# Patient Record
Sex: MALE | Race: WHITE | HISPANIC OR LATINO | Employment: OTHER | URBAN - METROPOLITAN AREA
[De-identification: names, ages, dates, MRNs, and addresses within clinical notes are randomized per-mention and may not be internally consistent; named-entity substitution may affect disease eponyms.]

---

## 2020-08-09 ENCOUNTER — HOSPITAL ENCOUNTER (EMERGENCY)
Facility: HOSPITAL | Age: 40
Discharge: HOME | End: 2020-08-09
Attending: EMERGENCY MEDICINE | Admitting: EMERGENCY MEDICINE

## 2020-08-09 ENCOUNTER — APPOINTMENT (OUTPATIENT)
Dept: CT IMAGING | Facility: HOSPITAL | Age: 40
End: 2020-08-09

## 2020-08-09 VITALS
RESPIRATION RATE: 16 BRPM | WEIGHT: 148 LBS | TEMPERATURE: 96.9 F | DIASTOLIC BLOOD PRESSURE: 99 MMHG | OXYGEN SATURATION: 100 % | BODY MASS INDEX: 23.78 KG/M2 | SYSTOLIC BLOOD PRESSURE: 132 MMHG | HEIGHT: 66 IN | HEART RATE: 69 BPM

## 2020-08-09 DIAGNOSIS — M25.551 PELVIC JOINT PAIN, RIGHT: Primary | ICD-10-CM

## 2020-08-09 LAB
ANION GAP SERPL CALCULATED.3IONS-SCNC: 8.1 MMOL/L (ref 5–15)
BASOPHILS # BLD AUTO: 0.02 10*3/MM3 (ref 0–0.2)
BASOPHILS NFR BLD AUTO: 0.4 % (ref 0–1.5)
BUN SERPL-MCNC: 11 MG/DL (ref 6–20)
BUN/CREAT SERPL: 11.7 (ref 7–25)
CALCIUM SPEC-SCNC: 9.5 MG/DL (ref 8.6–10.5)
CHLORIDE SERPL-SCNC: 106 MMOL/L (ref 98–107)
CO2 SERPL-SCNC: 26.9 MMOL/L (ref 22–29)
CREAT SERPL-MCNC: 0.94 MG/DL (ref 0.76–1.27)
DEPRECATED RDW RBC AUTO: 42 FL (ref 37–54)
EOSINOPHIL # BLD AUTO: 0.32 10*3/MM3 (ref 0–0.4)
EOSINOPHIL NFR BLD AUTO: 6.4 % (ref 0.3–6.2)
ERYTHROCYTE [DISTWIDTH] IN BLOOD BY AUTOMATED COUNT: 12.7 % (ref 12.3–15.4)
GFR SERPL CREATININE-BSD FRML MDRD: 90 ML/MIN/1.73
GLUCOSE SERPL-MCNC: 96 MG/DL (ref 65–99)
HCT VFR BLD AUTO: 39.2 % (ref 37.5–51)
HGB BLD-MCNC: 13.4 G/DL (ref 13–17.7)
IMM GRANULOCYTES # BLD AUTO: 0.01 10*3/MM3 (ref 0–0.05)
IMM GRANULOCYTES NFR BLD AUTO: 0.2 % (ref 0–0.5)
LYMPHOCYTES # BLD AUTO: 1.23 10*3/MM3 (ref 0.7–3.1)
LYMPHOCYTES NFR BLD AUTO: 24.5 % (ref 19.6–45.3)
MCH RBC QN AUTO: 31.1 PG (ref 26.6–33)
MCHC RBC AUTO-ENTMCNC: 34.2 G/DL (ref 31.5–35.7)
MCV RBC AUTO: 91 FL (ref 79–97)
MONOCYTES # BLD AUTO: 0.33 10*3/MM3 (ref 0.1–0.9)
MONOCYTES NFR BLD AUTO: 6.6 % (ref 5–12)
NEUTROPHILS NFR BLD AUTO: 3.12 10*3/MM3 (ref 1.7–7)
NEUTROPHILS NFR BLD AUTO: 61.9 % (ref 42.7–76)
NRBC BLD AUTO-RTO: 0 /100 WBC (ref 0–0.2)
PLATELET # BLD AUTO: 179 10*3/MM3 (ref 140–450)
PMV BLD AUTO: 10.1 FL (ref 6–12)
POTASSIUM SERPL-SCNC: 4.2 MMOL/L (ref 3.5–5.2)
RBC # BLD AUTO: 4.31 10*6/MM3 (ref 4.14–5.8)
SODIUM SERPL-SCNC: 141 MMOL/L (ref 136–145)
WBC # BLD AUTO: 5.03 10*3/MM3 (ref 3.4–10.8)

## 2020-08-09 PROCEDURE — 74177 CT ABD & PELVIS W/CONTRAST: CPT

## 2020-08-09 PROCEDURE — 80048 BASIC METABOLIC PNL TOTAL CA: CPT | Performed by: EMERGENCY MEDICINE

## 2020-08-09 PROCEDURE — 25010000002 IOPAMIDOL 61 % SOLUTION: Performed by: EMERGENCY MEDICINE

## 2020-08-09 PROCEDURE — 85025 COMPLETE CBC W/AUTO DIFF WBC: CPT | Performed by: EMERGENCY MEDICINE

## 2020-08-09 PROCEDURE — 99283 EMERGENCY DEPT VISIT LOW MDM: CPT

## 2020-08-09 RX ORDER — SODIUM CHLORIDE 0.9 % (FLUSH) 0.9 %
10 SYRINGE (ML) INJECTION AS NEEDED
Status: DISCONTINUED | OUTPATIENT
Start: 2020-08-09 | End: 2020-08-09 | Stop reason: HOSPADM

## 2020-08-09 RX ADMIN — IOPAMIDOL 85 ML: 612 INJECTION, SOLUTION INTRAVENOUS at 13:50

## 2020-08-09 NOTE — ED NOTES
Patient masked in triage and taken to room.  C/P Right sided pelvic pain x 7 months.  The pain has continued to worsen, so the patient came to the ER, current pain level is a 3 out of 10.     Eric Hagen RN  08/09/20 1137

## 2020-08-09 NOTE — ED NOTES
Patient reports that approximately a year ago he felt a sharp pain during sexual intercourse in his right hip. Patient reports that right after the injury he did have blood in his semen but denies having anymore blood in his urine or semen since. Patient reports that [facility] and they have not been able to diagnose him with anything. Patient in mask at this time. This RN in mask and goggles.      Mari Morton RN  08/09/20 5405

## 2020-08-09 NOTE — ED PROVIDER NOTES
" EMERGENCY DEPARTMENT ENCOUNTER    Room Number:  44/44  Date of encounter:  8/9/2020  PCP: Provider, No Known  Historian: Patient      HPI:  Chief Complaint: Right pelvic pain  A complete HPI/ROS/PMH/PSH/SH/FH are unobtainable due to: None    Context: Gio Deluca is a 40 y.o. male who presents to the ED c/o right pelvic pain.  Onset proxy 1 year ago that occurred after he was having rough sex.  He states that over the past 1 month he has had worsened pain in the right inguinal region and at the floor of his pelvis.  Pain is constant.  Worse with palpation.  He states that he has had trouble having erection for the past couple months.  He states that he feels like he cannot contract his kegels normally.  He denies any urinary incontinence, difficulty urinating or change in stool aside from it feeling like \"something has shifted\" when he stools.  Denies have any fever.  No burning with urination.  No vomiting.    PAST MEDICAL HISTORY  Active Ambulatory Problems     Diagnosis Date Noted   • No Active Ambulatory Problems     Resolved Ambulatory Problems     Diagnosis Date Noted   • No Resolved Ambulatory Problems     No Additional Past Medical History         PAST SURGICAL HISTORY  Past Surgical History:   Procedure Laterality Date   • KNEE ACL RECONSTRUCTION Left          FAMILY HISTORY  History reviewed. No pertinent family history.      SOCIAL HISTORY  Social History     Socioeconomic History   • Marital status: Single     Spouse name: Not on file   • Number of children: Not on file   • Years of education: Not on file   • Highest education level: Not on file   Tobacco Use   • Smoking status: Never Smoker   • Smokeless tobacco: Never Used   Substance and Sexual Activity   • Alcohol use: Yes   • Drug use: Never         ALLERGIES  Patient has no known allergies.        REVIEW OF SYSTEMS  Review of Systems     All systems reviewed and negative except for those discussed in HPI.       PHYSICAL EXAM    I have reviewed the " triage vital signs and nursing notes.    ED Triage Vitals [08/09/20 1137]   Temp Heart Rate Resp BP SpO2   96.9 °F (36.1 °C) 63 16 -- 100 %      Temp src Heart Rate Source Patient Position BP Location FiO2 (%)   Tympanic Monitor -- -- --       Physical Exam  GENERAL: not distressed  HENT: nares patent  EYES: no scleral icterus  CV: regular rhythm, regular rate  RESPIRATORY: normal effort  ABDOMEN: soft, nontender, tenderness to palpation along the issue him on the right side without overlying redness or warmth, no crepitus  : Normal external genitalia, normal testicular lie, normal phallus  MUSCULOSKELETAL: no deformity  NEURO: alert, moves all extremities, follows commands  SKIN: warm, dry        LAB RESULTS  Recent Results (from the past 24 hour(s))   Basic Metabolic Panel    Collection Time: 08/09/20 12:50 PM   Result Value Ref Range    Glucose 96 65 - 99 mg/dL    BUN 11 6 - 20 mg/dL    Creatinine 0.94 0.76 - 1.27 mg/dL    Sodium 141 136 - 145 mmol/L    Potassium 4.2 3.5 - 5.2 mmol/L    Chloride 106 98 - 107 mmol/L    CO2 26.9 22.0 - 29.0 mmol/L    Calcium 9.5 8.6 - 10.5 mg/dL    eGFR Non African Amer 90 >60 mL/min/1.73    BUN/Creatinine Ratio 11.7 7.0 - 25.0    Anion Gap 8.1 5.0 - 15.0 mmol/L   CBC Auto Differential    Collection Time: 08/09/20 12:50 PM   Result Value Ref Range    WBC 5.03 3.40 - 10.80 10*3/mm3    RBC 4.31 4.14 - 5.80 10*6/mm3    Hemoglobin 13.4 13.0 - 17.7 g/dL    Hematocrit 39.2 37.5 - 51.0 %    MCV 91.0 79.0 - 97.0 fL    MCH 31.1 26.6 - 33.0 pg    MCHC 34.2 31.5 - 35.7 g/dL    RDW 12.7 12.3 - 15.4 %    RDW-SD 42.0 37.0 - 54.0 fl    MPV 10.1 6.0 - 12.0 fL    Platelets 179 140 - 450 10*3/mm3    Neutrophil % 61.9 42.7 - 76.0 %    Lymphocyte % 24.5 19.6 - 45.3 %    Monocyte % 6.6 5.0 - 12.0 %    Eosinophil % 6.4 (H) 0.3 - 6.2 %    Basophil % 0.4 0.0 - 1.5 %    Immature Grans % 0.2 0.0 - 0.5 %    Neutrophils, Absolute 3.12 1.70 - 7.00 10*3/mm3    Lymphocytes, Absolute 1.23 0.70 - 3.10 10*3/mm3     Monocytes, Absolute 0.33 0.10 - 0.90 10*3/mm3    Eosinophils, Absolute 0.32 0.00 - 0.40 10*3/mm3    Basophils, Absolute 0.02 0.00 - 0.20 10*3/mm3    Immature Grans, Absolute 0.01 0.00 - 0.05 10*3/mm3    nRBC 0.0 0.0 - 0.2 /100 WBC       Ordered the above labs and independently reviewed the results.        RADIOLOGY  Ct Abdomen Pelvis With Contrast    Result Date: 2020  CT SCAN ABDOMEN AND PELVIS WITH INTRAVENOUS CONTRAST  HISTORY: Right lower quadrant pain.  The CT scan was performed as an emergency procedure through the abdomen and pelvis with intravenous contrast and demonstrates the followin. The lung bases are clear. The liver, spleen, pancreas, both adrenal glands, and both kidneys appear normal. The gallbladder shows no gallstones or wall thickening. 2. There is no aortic aneurysm or retroperitoneal lymphadenopathy. The large and small bowel loops are normal in caliber and show no inflammatory change. The appendix appears normal. No abnormality is seen in the pelvis. 3. At bone windows, there is a very tiny collection of gas at the anterior margin of the right sacroiliac joint and most consistent with degenerative change. This is best seen on images 96 through 99. The bony structures are otherwise unremarkable.      Radiation dose reduction techniques were utilized, including automated exposure control and exposure modulation based on body size.         I ordered the above noted radiological studies. Reviewed by me and discussed with radiologist.  See dictation for official radiology interpretation.      PROCEDURES    Procedures      MEDICATIONS GIVEN IN ER    Medications   sodium chloride 0.9 % flush 10 mL (has no administration in time range)   iopamidol (ISOVUE-300) 61 % injection 100 mL (85 mL Intravenous Given by Other 20 1350)         PROGRESS, DATA ANALYSIS, CONSULTS, AND MEDICAL DECISION MAKING    All labs have been independently reviewed by me.  All radiology studies have been reviewed  by me and discussed with radiologist dictating the report.   EKG's independently viewed and interpreted by me.  Discussion below represents my analysis of pertinent findings related to patient's condition, differential diagnosis, treatment plan and final disposition.    Differential diagnosis includes bone malignancy, infection, appendicitis, muscle strain.  I have low suspicion for cauda equina syndrome.  He has no back pain.  This is a chronic and indolent issue.  I believe that he may benefit from an MRI as an outpatient, however.  Patient has seen urology for this issue already.    I discussed the CT results with Dr. Ghsoh.  He says that the CT shows a small focus of gas near the right SI joint.  However, there is no secondary sign of inflammation.  The patient is ambulatory without any redness or warmth overlying the hip.  He has full range of motion with no pain passively.  I do not think there is any deep infection at this time.  I discussed this finding with the patient.  He is aware and will follow up with PCP.          Blood cell count is 5      PPE: Both the patient and I wore a surgical mask throughout the entire patient encounter. I wore protective goggles.     AS OF 15:20 VITALS:    BP - 132/99  HR - 69  TEMP - 96.9 °F (36.1 °C) (Tympanic)  O2 SATS - 100%        DIAGNOSIS  Final diagnoses:   Pelvic joint pain, right         DISPOSITION  DISCHARGE    FOLLOW-UP  PATIENT LIAISON [facility] [address] [phone]  Call in 1 day  To schedule an appointment with a primary care physician.         Medication List      No changes were made to your prescriptions during this visit.                Moody Amor II, MD  08/09/20 2748

## 2020-08-14 ENCOUNTER — OFFICE VISIT (OUTPATIENT)
Dept: FAMILY MEDICINE CLINIC | Facility: CLINIC | Age: 40
End: 2020-08-14

## 2020-08-14 VITALS
HEIGHT: 66 IN | DIASTOLIC BLOOD PRESSURE: 73 MMHG | OXYGEN SATURATION: 98 % | HEART RATE: 74 BPM | SYSTOLIC BLOOD PRESSURE: 110 MMHG | WEIGHT: 152.6 LBS | RESPIRATION RATE: 16 BRPM | TEMPERATURE: 97.8 F | BODY MASS INDEX: 24.53 KG/M2

## 2020-08-14 DIAGNOSIS — R10.2 PELVIC PAIN: ICD-10-CM

## 2020-08-14 DIAGNOSIS — N50.811 TESTICULAR PAIN, RIGHT: Primary | ICD-10-CM

## 2020-08-14 PROCEDURE — 99203 OFFICE O/P NEW LOW 30 MIN: CPT | Performed by: NURSE PRACTITIONER

## 2020-08-14 NOTE — PROGRESS NOTES
"Bhumi Deluca is a 40 y.o.. male.     Pt here today to become established.     Pt stating he has right groin/right pelvic floor pain. Pt stating he had an injury to groin about one year ago and has not been the same since. Pt stating he does have blood with semen at times. Pt stating he does have pain with ejaculation. Pt does c/o penile and testicular pain. Pt stating right groin and right pelvic floor pain worsens with sitting or standing for long periods of time. Pt stating he does have issues stopping urine stream. Pt stating he has 1 bm every 2-3 days. Pt denies any n/v/d, dysuria, frequency/urgency with urination, hematuria, and/or penile or scrotal swelling.      The following portions of the patient's history were reviewed and updated as appropriate: allergies, current medications, past family history, past medical history, past social history, past surgical history and problem list.    History reviewed. No pertinent past medical history.    Past Surgical History:   Procedure Laterality Date   • KNEE ACL RECONSTRUCTION Left        Review of Systems   Constitutional: Negative for fever.   Respiratory: Negative.    Cardiovascular: Negative.    Gastrointestinal: Negative for abdominal pain, diarrhea, nausea and vomiting.   Genitourinary: Positive for discharge (blood with semen), penile pain and testicular pain. Negative for dysuria, frequency, hematuria, penile swelling, scrotal swelling and urgency.        Right groin pain for one year, comes and goes, worse with standing or sitting for long time period  Has problem stopping stream of urine  Pain with ejaculation       No Known Allergies    Objective     Vitals:    08/14/20 1100   BP: 110/73   BP Location: Left arm   Patient Position: Sitting   Cuff Size: Adult   Pulse: 74   Resp: 16   Temp: 97.8 °F (36.6 °C)   SpO2: 98%   Weight: 69.2 kg (152 lb 9.6 oz)   Height: 167.6 cm (66\")     Body mass index is 24.63 kg/m².    Physical Exam "   Constitutional: He is oriented to person, place, and time. He appears well-developed.   HENT:   Head: Normocephalic.   Eyes: Pupils are equal, round, and reactive to light.   Cardiovascular: Normal rate and regular rhythm.   Pulmonary/Chest: Effort normal and breath sounds normal.   Abdominal: Soft. Normal appearance. He exhibits no distension and no mass. There is no hepatosplenomegaly. There is tenderness in the right lower quadrant and suprapubic area. There is no rigidity, no rebound and no guarding. No hernia.   Genitourinary: Penis normal. Right testis shows tenderness. Right testis shows no mass and no swelling. Right testis is descended.   Musculoskeletal: Normal range of motion.   Neurological: He is alert and oriented to person, place, and time.   Vitals reviewed.        Current Outpatient Medications:   •  naproxen (NAPROSYN) 500 MG tablet, Take 1 tablet by mouth 2 (Two) Times a Day With Meals., Disp: 30 tablet, Rfl: 0    Recent Results (from the past 2016 hour(s))   Basic Metabolic Panel    Collection Time: 08/09/20 12:50 PM   Result Value Ref Range    Glucose 96 65 - 99 mg/dL    BUN 11 6 - 20 mg/dL    Creatinine 0.94 0.76 - 1.27 mg/dL    Sodium 141 136 - 145 mmol/L    Potassium 4.2 3.5 - 5.2 mmol/L    Chloride 106 98 - 107 mmol/L    CO2 26.9 22.0 - 29.0 mmol/L    Calcium 9.5 8.6 - 10.5 mg/dL    eGFR Non African Amer 90 >60 mL/min/1.73    BUN/Creatinine Ratio 11.7 7.0 - 25.0    Anion Gap 8.1 5.0 - 15.0 mmol/L   CBC Auto Differential    Collection Time: 08/09/20 12:50 PM   Result Value Ref Range    WBC 5.03 3.40 - 10.80 10*3/mm3    RBC 4.31 4.14 - 5.80 10*6/mm3    Hemoglobin 13.4 13.0 - 17.7 g/dL    Hematocrit 39.2 37.5 - 51.0 %    MCV 91.0 79.0 - 97.0 fL    MCH 31.1 26.6 - 33.0 pg    MCHC 34.2 31.5 - 35.7 g/dL    RDW 12.7 12.3 - 15.4 %    RDW-SD 42.0 37.0 - 54.0 fl    MPV 10.1 6.0 - 12.0 fL    Platelets 179 140 - 450 10*3/mm3    Neutrophil % 61.9 42.7 - 76.0 %    Lymphocyte % 24.5 19.6 - 45.3 %     Monocyte % 6.6 5.0 - 12.0 %    Eosinophil % 6.4 (H) 0.3 - 6.2 %    Basophil % 0.4 0.0 - 1.5 %    Immature Grans % 0.2 0.0 - 0.5 %    Neutrophils, Absolute 3.12 1.70 - 7.00 10*3/mm3    Lymphocytes, Absolute 1.23 0.70 - 3.10 10*3/mm3    Monocytes, Absolute 0.33 0.10 - 0.90 10*3/mm3    Eosinophils, Absolute 0.32 0.00 - 0.40 10*3/mm3    Basophils, Absolute 0.02 0.00 - 0.20 10*3/mm3    Immature Grans, Absolute 0.01 0.00 - 0.05 10*3/mm3    nRBC 0.0 0.0 - 0.2 /100 WBC       CT Abdomen Pelvis With Contrast  CT SCAN ABDOMEN AND PELVIS WITH INTRAVENOUS CONTRAST     HISTORY: Right lower quadrant pain.     The CT scan was performed as an emergency procedure through the abdomen  and pelvis with intravenous contrast and demonstrates the followin. The lung bases are clear. The liver, spleen, pancreas, both adrenal  glands, and both kidneys appear normal. The gallbladder shows no  gallstones or wall thickening.  2. There is no aortic aneurysm or retroperitoneal lymphadenopathy. The  large and small bowel loops are normal in caliber and show no  inflammatory change. The appendix appears normal. No abnormality is seen  in the pelvis.  3. At bone windows, there is a very tiny collection of gas at the  anterior margin of the right sacroiliac joint and most consistent with  degenerative change. This is best seen on images 96 through 99. The bony  structures are otherwise unremarkable.                 Radiation dose reduction techniques were utilized, including automated  exposure control and exposure modulation based on body size.     This report was finalized on 2020 3:23 PM by Dr. Amari Ghosh M.D.         Assessment/Plan   Gio was seen today for establish care.    Diagnoses and all orders for this visit:    Testicular pain, right  -     US testicular or ovarian vascular limited; Future  -     Urinalysis With Culture If Indicated - Urine, Clean Catch  -     Chlamydia trachomatis, Neisseria gonorrhoeae, PCR - Urine, Urine,  Clean Catch  -     HIV-1/O/2 Ag/Ab w Reflex  -     RPR  -     HSV 1 & 2 IgM, Antibodies, Indirect    Pelvic pain  -     US Pelvis Complete; Future        Return for follow up in 2 weeks for testicular/pelvic floor pain and lab/testing results.

## 2020-08-18 LAB
APPEARANCE UR: CLEAR
BACTERIA #/AREA URNS HPF: NORMAL /HPF
BILIRUB UR QL STRIP: NEGATIVE
C TRACH RRNA SPEC QL NAA+PROBE: NEGATIVE
COLOR UR: YELLOW
EPI CELLS #/AREA URNS HPF: NORMAL /HPF (ref 0–10)
GLUCOSE UR QL: NEGATIVE
HGB UR QL STRIP: NEGATIVE
HIV 1+2 AB+HIV1 P24 AG SERPL QL IA: NON REACTIVE
HSV1 IGM TITR SER IF: NORMAL TITER
HSV2 IGM TITR SER IF: NORMAL TITER
KETONES UR QL STRIP: NEGATIVE
LEUKOCYTE ESTERASE UR QL STRIP: NEGATIVE
MICRO URNS: NORMAL
MICRO URNS: NORMAL
MUCOUS THREADS URNS QL MICRO: PRESENT /HPF
N GONORRHOEA RRNA SPEC QL NAA+PROBE: NEGATIVE
NITRITE UR QL STRIP: NEGATIVE
PH UR STRIP: 6 [PH] (ref 5–7.5)
PROT UR QL STRIP: NEGATIVE
RBC #/AREA URNS HPF: NORMAL /HPF (ref 0–2)
RPR SER QL: NORMAL
SP GR UR: 1.03 (ref 1–1.03)
URINALYSIS REFLEX: NORMAL
UROBILINOGEN UR STRIP-MCNC: 0.2 MG/DL (ref 0.2–1)
WBC #/AREA URNS HPF: NORMAL /HPF (ref 0–5)

## 2020-08-21 ENCOUNTER — OFFICE VISIT (OUTPATIENT)
Dept: FAMILY MEDICINE CLINIC | Facility: CLINIC | Age: 40
End: 2020-08-21

## 2020-08-21 VITALS
DIASTOLIC BLOOD PRESSURE: 80 MMHG | TEMPERATURE: 98.7 F | SYSTOLIC BLOOD PRESSURE: 130 MMHG | HEART RATE: 82 BPM | OXYGEN SATURATION: 98 % | RESPIRATION RATE: 16 BRPM | WEIGHT: 152.4 LBS | BODY MASS INDEX: 24.49 KG/M2 | HEIGHT: 66 IN

## 2020-08-21 DIAGNOSIS — N50.811 TESTICULAR PAIN, RIGHT: Primary | ICD-10-CM

## 2020-08-21 DIAGNOSIS — R10.2 PELVIC PAIN: ICD-10-CM

## 2020-08-21 PROCEDURE — 99213 OFFICE O/P EST LOW 20 MIN: CPT | Performed by: NURSE PRACTITIONER

## 2020-08-21 NOTE — PATIENT INSTRUCTIONS
Discussed labs in detail; awaiting u/s results; pt declined urology referral at this time (has been to urologist in past without any results).

## 2020-08-21 NOTE — PROGRESS NOTES
"Bhumi Deluca is a 40 y.o.. male.     Pt here today for f/u on labs. Pt denies having u/s done yet. On further investigation it looks like scheduling has attempted to get in touch with pt several times without results to get u/s scheduled. Pt given number to scheduling and informed to call to schedule u/s. Pt verb. Understanding. Pt denies any changes to pain. Pt stating he is starting to feel fatigued. Pt stating he has started running again. Pt denies pain to pelvic floor/testicular pain with running. Pt denies any other new symptoms.       The following portions of the patient's history were reviewed and updated as appropriate: allergies, current medications, past family history, past medical history, past social history, past surgical history and problem list.    History reviewed. No pertinent past medical history.    Past Surgical History:   Procedure Laterality Date   • KNEE ACL RECONSTRUCTION Left        Review of Systems   Constitutional: Positive for fatigue. Negative for fever.   Respiratory: Negative.    Cardiovascular: Negative.    Gastrointestinal: Negative.    Genitourinary: Positive for testicular pain (pelvic floor pain). Negative for difficulty urinating, frequency and urgency.       No Known Allergies    Objective     Vitals:    08/21/20 1302   BP: 130/80   Pulse: 82   Resp: 16   Temp: 98.7 °F (37.1 °C)   SpO2: 98%   Weight: 69.1 kg (152 lb 6.4 oz)   Height: 167.6 cm (66\")     Body mass index is 24.6 kg/m².    Physical Exam   Constitutional: He is oriented to person, place, and time. He appears well-developed.   HENT:   Head: Normocephalic.   Eyes: Pupils are equal, round, and reactive to light.   Cardiovascular: Normal rate and regular rhythm.   Pulmonary/Chest: Effort normal and breath sounds normal.   Musculoskeletal: Normal range of motion.   Neurological: He is alert and oriented to person, place, and time.   Vitals reviewed.      No current outpatient medications on " file.    Recent Results (from the past 2016 hour(s))   Basic Metabolic Panel    Collection Time: 08/09/20 12:50 PM   Result Value Ref Range    Glucose 96 65 - 99 mg/dL    BUN 11 6 - 20 mg/dL    Creatinine 0.94 0.76 - 1.27 mg/dL    Sodium 141 136 - 145 mmol/L    Potassium 4.2 3.5 - 5.2 mmol/L    Chloride 106 98 - 107 mmol/L    CO2 26.9 22.0 - 29.0 mmol/L    Calcium 9.5 8.6 - 10.5 mg/dL    eGFR Non African Amer 90 >60 mL/min/1.73    BUN/Creatinine Ratio 11.7 7.0 - 25.0    Anion Gap 8.1 5.0 - 15.0 mmol/L   CBC Auto Differential    Collection Time: 08/09/20 12:50 PM   Result Value Ref Range    WBC 5.03 3.40 - 10.80 10*3/mm3    RBC 4.31 4.14 - 5.80 10*6/mm3    Hemoglobin 13.4 13.0 - 17.7 g/dL    Hematocrit 39.2 37.5 - 51.0 %    MCV 91.0 79.0 - 97.0 fL    MCH 31.1 26.6 - 33.0 pg    MCHC 34.2 31.5 - 35.7 g/dL    RDW 12.7 12.3 - 15.4 %    RDW-SD 42.0 37.0 - 54.0 fl    MPV 10.1 6.0 - 12.0 fL    Platelets 179 140 - 450 10*3/mm3    Neutrophil % 61.9 42.7 - 76.0 %    Lymphocyte % 24.5 19.6 - 45.3 %    Monocyte % 6.6 5.0 - 12.0 %    Eosinophil % 6.4 (H) 0.3 - 6.2 %    Basophil % 0.4 0.0 - 1.5 %    Immature Grans % 0.2 0.0 - 0.5 %    Neutrophils, Absolute 3.12 1.70 - 7.00 10*3/mm3    Lymphocytes, Absolute 1.23 0.70 - 3.10 10*3/mm3    Monocytes, Absolute 0.33 0.10 - 0.90 10*3/mm3    Eosinophils, Absolute 0.32 0.00 - 0.40 10*3/mm3    Basophils, Absolute 0.02 0.00 - 0.20 10*3/mm3    Immature Grans, Absolute 0.01 0.00 - 0.05 10*3/mm3    nRBC 0.0 0.0 - 0.2 /100 WBC   Urinalysis With Culture If Indicated - Urine, Clean Catch    Collection Time: 08/14/20 11:44 AM   Result Value Ref Range    Specific Gravity, UA 1.029 1.005 - 1.030    pH, UA 6.0 5.0 - 7.5    Color, UA Yellow Yellow    Appearance, UA Clear Clear    Leukocytes, UA Negative Negative    Protein Negative Negative/Trace    Glucose, UA Negative Negative    Ketones Negative Negative    Blood, UA Negative Negative    Bilirubin, UA Negative Negative    Urobilinogen, UA 0.2 0.2 -  1.0 mg/dL    Nitrite, UA Negative Negative    Microscopic Examination Comment     Microscopic Examination See below:     Urinalysis Reflex Comment    Chlamydia trachomatis, Neisseria gonorrhoeae, PCR - Urine, Urine, Clean Catch    Collection Time: 20 11:44 AM   Result Value Ref Range    Chlamydia trachomatis, DEANNA Negative Negative    Neisseria gonorrhoeae, DEANNA Negative Negative   HIV-1/O/2 Ag/Ab w Reflex    Collection Time: 20 11:44 AM   Result Value Ref Range    HIV Screen 4th Gen w/RFX (Reference) Non Reactive Non Reactive   RPR    Collection Time: 20 11:44 AM   Result Value Ref Range    RPR Comment Non-Reactive   HSV 1 & 2 IgM, Antibodies, Indirect    Collection Time: 20 11:44 AM   Result Value Ref Range    HSV 1 IgM <1:10 <1:10 titer    HSV 2 IgM <1:10 <1:10 titer   Microscopic Examination -    Collection Time: 20 11:44 AM   Result Value Ref Range    WBC, UA 0-5 0 - 5 /hpf    RBC, UA 0-2 0 - 2 /hpf    Epithelial Cells (non renal) None seen 0 - 10 /hpf    Mucus, UA Present Not Estab. /hpf    Bacteria, UA Few None seen/Few /hpf       CT Abdomen Pelvis With Contrast  CT SCAN ABDOMEN AND PELVIS WITH INTRAVENOUS CONTRAST     HISTORY: Right lower quadrant pain.     The CT scan was performed as an emergency procedure through the abdomen  and pelvis with intravenous contrast and demonstrates the followin. The lung bases are clear. The liver, spleen, pancreas, both adrenal  glands, and both kidneys appear normal. The gallbladder shows no  gallstones or wall thickening.  2. There is no aortic aneurysm or retroperitoneal lymphadenopathy. The  large and small bowel loops are normal in caliber and show no  inflammatory change. The appendix appears normal. No abnormality is seen  in the pelvis.  3. At bone windows, there is a very tiny collection of gas at the  anterior margin of the right sacroiliac joint and most consistent with  degenerative change. This is best seen on images 96 through  99. The bony  structures are otherwise unremarkable.                 Radiation dose reduction techniques were utilized, including automated  exposure control and exposure modulation based on body size.     This report was finalized on 8/9/2020 3:23 PM by Dr. Amari Ghosh M.D.         Assessment/Plan   Gio was seen today for follow-up.    Diagnoses and all orders for this visit:    Testicular pain, right    Pelvic pain        Patient Instructions   Discussed labs in detail; awaiting u/s results; pt declined urology referral at this time (has been to urologist in past without any results).       Return if symptoms worsen or fail to improve, for follow up as needed.

## 2020-08-26 ENCOUNTER — HOSPITAL ENCOUNTER (OUTPATIENT)
Dept: ULTRASOUND IMAGING | Facility: HOSPITAL | Age: 40
End: 2020-08-26

## 2020-08-27 ENCOUNTER — HOSPITAL ENCOUNTER (OUTPATIENT)
Dept: ULTRASOUND IMAGING | Facility: HOSPITAL | Age: 40
Discharge: HOME | End: 2020-08-27

## 2020-08-27 DIAGNOSIS — N50.811 TESTICULAR PAIN, RIGHT: ICD-10-CM

## 2020-08-27 DIAGNOSIS — R10.2 PELVIC PAIN: ICD-10-CM

## 2021-04-16 ENCOUNTER — BULK ORDERING (OUTPATIENT)
Dept: CASE MANAGEMENT | Facility: OTHER | Age: 41
End: 2021-04-16

## 2021-04-16 DIAGNOSIS — Z23 IMMUNIZATION DUE: ICD-10-CM

## 2021-04-28 ENCOUNTER — TELEPHONE (OUTPATIENT)
Dept: FAMILY MEDICINE CLINIC | Facility: CLINIC | Age: 41
End: 2021-04-28

## 2021-04-28 NOTE — TELEPHONE ENCOUNTER
DELETE AFTER REVIEWING: Telephone encounter to be sent to the clinical pool   Hub staff attempted to follow warm transfer process and was unsuccessful     Caller: ANMOL OHARA    Relationship to patient: PATIENT    Best call back number: [phone]     Patient is needing:  PATIENT IS CALLING TO SCHEDULE A NEW PATIENT APPOINTMENT WITH DR. WALLACE.  WHEN WE STARTED TO SCHEDULE, HE WAS ASKED IF HE HAD A CHIEF COMPLAINT AND HE STATED THAT HE HAS BEEN HAVING SOME LEFT SIDED LUNG OR LIVER PAIN.  HE WAS ASKED SEVERAL QUESTIONS REGARDING WHERE THE PAIN WAS, AND HE KEPT SAYING LEFT SIDE AROUND THE LUNG AND CHEST AREA.  HE WAS ADVISED THAT HE SHOULD GO TO THE ER.  HE WAS TOLD THAT WE CAN GO AHEAD AND SCHEDULE A NEW PATIENT APPOINTMENT BUT HE STILL SHOULD GO ON TO THE ER.  HE STATED THAT IF HE DOES NOT FEEL BETTER TOMORROW, HE WILL THINK ABOUT GOING TO THE ER.    PLEASE ADVISE

## 2021-06-04 ENCOUNTER — OFFICE VISIT (OUTPATIENT)
Dept: FAMILY MEDICINE CLINIC | Facility: CLINIC | Age: 41
End: 2021-06-04

## 2021-06-04 VITALS
DIASTOLIC BLOOD PRESSURE: 84 MMHG | SYSTOLIC BLOOD PRESSURE: 118 MMHG | TEMPERATURE: 97.8 F | HEIGHT: 66 IN | HEART RATE: 81 BPM | OXYGEN SATURATION: 97 % | WEIGHT: 161.8 LBS | BODY MASS INDEX: 26 KG/M2

## 2021-06-04 DIAGNOSIS — R07.9 CHEST PAIN, UNSPECIFIED TYPE: ICD-10-CM

## 2021-06-04 DIAGNOSIS — R05.9 COUGH: ICD-10-CM

## 2021-06-04 DIAGNOSIS — R06.02 SHORTNESS OF BREATH: ICD-10-CM

## 2021-06-04 DIAGNOSIS — Z00.00 WELLNESS EXAMINATION: Primary | ICD-10-CM

## 2021-06-04 DIAGNOSIS — J02.9 SORE THROAT: ICD-10-CM

## 2021-06-04 DIAGNOSIS — J40 BRONCHITIS: ICD-10-CM

## 2021-06-04 LAB
BILIRUB BLD-MCNC: NEGATIVE MG/DL
CLARITY, POC: CLEAR
COLOR UR: YELLOW
EXPIRATION DATE: NORMAL
GLUCOSE UR STRIP-MCNC: NEGATIVE MG/DL
INTERNAL CONTROL: NORMAL
KETONES UR QL: NEGATIVE
LEUKOCYTE EST, POC: NEGATIVE
Lab: NORMAL
NITRITE UR-MCNC: NEGATIVE MG/ML
PH UR: 6 [PH] (ref 5–8)
PROT UR STRIP-MCNC: NEGATIVE MG/DL
RBC # UR STRIP: NEGATIVE /UL
S PYO AG THROAT QL: NEGATIVE
SP GR UR: 1.03 (ref 1–1.03)
UROBILINOGEN UR QL: NORMAL

## 2021-06-04 PROCEDURE — 71046 X-RAY EXAM CHEST 2 VIEWS: CPT | Performed by: FAMILY MEDICINE

## 2021-06-04 PROCEDURE — 81003 URINALYSIS AUTO W/O SCOPE: CPT | Performed by: FAMILY MEDICINE

## 2021-06-04 PROCEDURE — 99214 OFFICE O/P EST MOD 30 MIN: CPT | Performed by: FAMILY MEDICINE

## 2021-06-04 PROCEDURE — 87880 STREP A ASSAY W/OPTIC: CPT | Performed by: FAMILY MEDICINE

## 2021-06-04 PROCEDURE — 93000 ELECTROCARDIOGRAM COMPLETE: CPT | Performed by: FAMILY MEDICINE

## 2021-06-04 RX ORDER — BENZONATATE 100 MG/1
100 CAPSULE ORAL 3 TIMES DAILY PRN
Qty: 30 CAPSULE | Refills: 1 | Status: SHIPPED | OUTPATIENT
Start: 2021-06-04

## 2021-06-04 RX ORDER — DOXYCYCLINE HYCLATE 100 MG
100 TABLET ORAL 2 TIMES DAILY
Qty: 20 TABLET | Refills: 0 | Status: SHIPPED | OUTPATIENT
Start: 2021-06-04 | End: 2021-06-14

## 2021-06-04 NOTE — PROGRESS NOTES
"Chief Complaint  Establish Care    Subjective          Gio Deluca presents to [facility]  History of Present Illness  Looking x PMD , wants labs,  SOA with deep breath and CP left side, non smoker, x a year, no Asthma, no CAD in  Family, cough, with yellow sputum, not vaccinated against COVID    ECG 12 Lead    Date/Time: 6/4/2021 9:22 AM  Performed by: Dharmesh Mares MD  Authorized by: Dharmesh Mares MD   Comparison: not compared with previous ECG   Previous ECG: no previous ECG available  Rhythm: sinus bradycardia  Rate: normal  Conduction: conduction normal  ST Segments: ST segments normal  T Waves: T waves normal  QRS axis: normal  Other: no other findings    Clinical impression: abnormal EKG            Objective   Vital Signs:   /84   Pulse 81   Temp 97.8 °F (36.6 °C)   Ht 167.6 cm (66\")   Wt 73.4 kg (161 lb 12.8 oz)   SpO2 97%   BMI 26.12 kg/m²     Physical Exam  Vitals and nursing note reviewed.   Constitutional:       Appearance: He is well-developed.   HENT:      Head: Normocephalic and atraumatic.      Right Ear: Tympanic membrane, ear canal and external ear normal.      Left Ear: Tympanic membrane, ear canal and external ear normal.      Nose: Nose normal.      Mouth/Throat:      Pharynx: Posterior oropharyngeal erythema present.      Comments: Pharynx erythema  Eyes:      General: No scleral icterus.        Right eye: No discharge.         Left eye: No discharge.      Conjunctiva/sclera: Conjunctivae normal.      Pupils: Pupils are equal, round, and reactive to light.   Neck:      Thyroid: No thyromegaly.      Vascular: No JVD.      Trachea: No tracheal deviation.   Cardiovascular:      Rate and Rhythm: Normal rate and regular rhythm.      Heart sounds: Normal heart sounds. No murmur heard.   No friction rub. No gallop.    Pulmonary:      Effort: Pulmonary effort is normal. No respiratory distress.      Breath sounds: Normal breath sounds. " No wheezing or rales.   Chest:      Chest wall: No tenderness.   Abdominal:      General: Bowel sounds are normal. There is no distension.      Palpations: Abdomen is soft. There is no mass.      Tenderness: There is no abdominal tenderness. There is no guarding or rebound.      Hernia: No hernia is present.   Musculoskeletal:         General: No tenderness. Normal range of motion.      Cervical back: Normal range of motion and neck supple.   Lymphadenopathy:      Cervical: No cervical adenopathy.   Skin:     General: Skin is warm and dry.   Neurological:      Mental Status: He is alert.      Cranial Nerves: No cranial nerve deficit.      Sensory: No sensory deficit.      Motor: No abnormal muscle tone.      Coordination: Coordination normal.      Deep Tendon Reflexes: Reflexes normal.   Psychiatric:         Behavior: Behavior normal.         Thought Content: Thought content normal.         Judgment: Judgment normal.        Result Review :                 Assessment and Plan    Diagnoses and all orders for this visit:    1. Wellness examination (Primary)  -     CBC & Differential  -     Comprehensive Metabolic Panel  -     Lipid Panel  -     TSH  -     POC Urinalysis Dipstick, Automated    2. Bronchitis  -     doxycycline (VIBRAMYICN) 100 MG tablet; Take 1 tablet by mouth 2 (Two) Times a Day for 10 days.  Dispense: 20 tablet; Refill: 0    3. Chest pain, unspecified type  -     Cancel: XR Chest 2 View  -     ECG 12 Lead  -     XR Chest 2 View    4. Sore throat  -     POC Rapid Strep A  -     COVID-19,[facility] ROUTINE, NP/OP SWAB IN TRANSPORT MEDIA OR ESWAB 72 HR TAT - Swab, Nasopharynx    5. Shortness of breath  -     Cancel: XR Chest 2 View  -     ECG 12 Lead  -     COVID-19,[facility] ROUTINE, NP/OP SWAB IN TRANSPORT MEDIA OR ESWAB 72 HR TAT - Swab, Nasopharynx  -     XR Chest 2 View    6. Cough  -     benzonatate (Tessalon Perles) 100 MG capsule; Take 1 capsule by mouth 3 (Three) Times a Day As Needed for Cough.   Dispense: 30 capsule; Refill: 1        Follow Up   Return in 3 weeks (on 6/25/2021), or if symptoms worsen or fail to improve.  Patient was given instructions and counseling regarding his condition or for health maintenance advice. Please see specific information pulled into the AVS if appropriate.   A Chest X-Ray was ordered. My reading of this film is negative. (No comparison films available: pending review by Radiologist.)  Patient agreed to go to the ER if no improvement

## 2021-06-05 DIAGNOSIS — R79.89 ELEVATED TSH: Primary | ICD-10-CM

## 2021-06-05 LAB
ALBUMIN SERPL-MCNC: 4.9 G/DL (ref 3.5–5.2)
ALBUMIN/GLOB SERPL: 2 G/DL
ALP SERPL-CCNC: 56 U/L (ref 39–117)
ALT SERPL-CCNC: 33 U/L (ref 1–41)
AST SERPL-CCNC: 18 U/L (ref 1–40)
BASOPHILS # BLD AUTO: 0.03 10*3/MM3 (ref 0–0.2)
BASOPHILS NFR BLD AUTO: 0.5 % (ref 0–1.5)
BILIRUB SERPL-MCNC: 0.3 MG/DL (ref 0–1.2)
BUN SERPL-MCNC: 14 MG/DL (ref 6–20)
BUN/CREAT SERPL: 14.7 (ref 7–25)
CALCIUM SERPL-MCNC: 10.2 MG/DL (ref 8.6–10.5)
CHLORIDE SERPL-SCNC: 108 MMOL/L (ref 98–107)
CHOLEST SERPL-MCNC: 185 MG/DL (ref 0–200)
CO2 SERPL-SCNC: 28 MMOL/L (ref 22–29)
CREAT SERPL-MCNC: 0.95 MG/DL (ref 0.76–1.27)
EOSINOPHIL # BLD AUTO: 0.28 10*3/MM3 (ref 0–0.4)
EOSINOPHIL NFR BLD AUTO: 4.3 % (ref 0.3–6.2)
ERYTHROCYTE [DISTWIDTH] IN BLOOD BY AUTOMATED COUNT: 12.7 % (ref 12.3–15.4)
GLOBULIN SER CALC-MCNC: 2.4 GM/DL
GLUCOSE SERPL-MCNC: 91 MG/DL (ref 65–99)
HCT VFR BLD AUTO: 41.5 % (ref 37.5–51)
HDLC SERPL-MCNC: 46 MG/DL (ref 40–60)
HGB BLD-MCNC: 14.1 G/DL (ref 13–17.7)
IMM GRANULOCYTES # BLD AUTO: 0.02 10*3/MM3 (ref 0–0.05)
IMM GRANULOCYTES NFR BLD AUTO: 0.3 % (ref 0–0.5)
LDLC SERPL CALC-MCNC: 118 MG/DL (ref 0–100)
LYMPHOCYTES # BLD AUTO: 1.54 10*3/MM3 (ref 0.7–3.1)
LYMPHOCYTES NFR BLD AUTO: 23.7 % (ref 19.6–45.3)
MCH RBC QN AUTO: 30.9 PG (ref 26.6–33)
MCHC RBC AUTO-ENTMCNC: 34 G/DL (ref 31.5–35.7)
MCV RBC AUTO: 91 FL (ref 79–97)
MONOCYTES # BLD AUTO: 0.57 10*3/MM3 (ref 0.1–0.9)
MONOCYTES NFR BLD AUTO: 8.8 % (ref 5–12)
NEUTROPHILS # BLD AUTO: 4.07 10*3/MM3 (ref 1.7–7)
NEUTROPHILS NFR BLD AUTO: 62.4 % (ref 42.7–76)
NRBC BLD AUTO-RTO: 0 /100 WBC (ref 0–0.2)
PLATELET # BLD AUTO: 200 10*3/MM3 (ref 140–450)
POTASSIUM SERPL-SCNC: 5.1 MMOL/L (ref 3.5–5.2)
PROT SERPL-MCNC: 7.3 G/DL (ref 6–8.5)
RBC # BLD AUTO: 4.56 10*6/MM3 (ref 4.14–5.8)
SODIUM SERPL-SCNC: 144 MMOL/L (ref 136–145)
TRIGL SERPL-MCNC: 119 MG/DL (ref 0–150)
TSH SERPL DL<=0.005 MIU/L-ACNC: 4.81 UIU/ML (ref 0.27–4.2)
VLDLC SERPL CALC-MCNC: 21 MG/DL (ref 5–40)
WBC # BLD AUTO: 6.51 10*3/MM3 (ref 3.4–10.8)

## 2021-06-06 LAB
LABCORP SARS-COV-2, NAA 2 DAY TAT: NORMAL
SARS-COV-2 RNA RESP QL NAA+PROBE: NOT DETECTED

## 2021-06-07 ENCOUNTER — TELEPHONE (OUTPATIENT)
Dept: FAMILY MEDICINE CLINIC | Facility: CLINIC | Age: 41
End: 2021-06-07

## 2021-06-07 NOTE — TELEPHONE ENCOUNTER
LMTCB     Okay for HUB to read     Cholesterol slightly elevated, mail diet and recheck in 6 months, TSH elevated, put him on lab schedule x more labs      Mailed Cholesterol Diet

## 2021-06-07 NOTE — TELEPHONE ENCOUNTER
----- Message from Dharmesh Mcdermott MD sent at 6/5/2021  7:21 AM EDT -----  Please call the patient regarding his abnormal result. Cholesterol slightly elevated, mail diet and recheck in 6 months, TSH elevated, put him on lab schedule x more labs

## 2021-06-08 ENCOUNTER — TELEPHONE (OUTPATIENT)
Dept: FAMILY MEDICINE CLINIC | Facility: CLINIC | Age: 41
End: 2021-06-08

## 2021-06-08 DIAGNOSIS — R79.89 ELEVATED TSH: ICD-10-CM

## 2021-06-08 NOTE — TELEPHONE ENCOUNTER
I called pt and told him CXR includes entire chest not one lung, told me he feels better already with therapy

## 2021-06-08 NOTE — TELEPHONE ENCOUNTER
ADAMSTCB     Okay for HUB to read     Patient had CXR, being the whole chest showing both lungs, not just one single lung

## 2021-06-08 NOTE — TELEPHONE ENCOUNTER
Caller: Gio Deluca    Relationship: Self    Best call back number: [phone] (H)    What is the best time to reach you:ANYTIME    Who are you requesting to speak with: PREFERABLY  HARPAL GRIDER MD.     Do you know the name of the person who called: PATIENT    What was the call regarding: PATIENT CALLED IN STATING HE HAD AN XRAY DONE ON HIS LUNG ON 6/4/21 IN OFFICE. STATES THAT HE GOT THE RESULTS ON MY CHART, BUT FEELS LIKE THE XRAY WAS TAKEN OF THE WRONG LUNG. STATED THAT HE THINKS THE XRAY WAS DONE ON THE RIGHT LUNG, BUT SHOULD HAVE BEEN DONE ON THE LEFT. PLEASE CALL PATIENT AND ADVISE. THANK YOU.    Do you require a callback:YES

## 2021-06-09 ENCOUNTER — TELEPHONE (OUTPATIENT)
Dept: FAMILY MEDICINE CLINIC | Facility: CLINIC | Age: 41
End: 2021-06-09

## 2021-06-09 LAB
T3FREE SERPL-MCNC: 3.4 PG/ML (ref 2–4.4)
T4 FREE SERPL-MCNC: 1.31 NG/DL (ref 0.93–1.7)

## 2021-06-09 NOTE — TELEPHONE ENCOUNTER
----- Message from Dharmesh Mcdermott MD sent at 6/9/2021  9:40 AM EDT -----  Please call the patient regarding his  result.  Good news T3 and T4  are normal, repeat TSH in a month

## 2021-06-25 ENCOUNTER — OFFICE VISIT (OUTPATIENT)
Dept: FAMILY MEDICINE CLINIC | Facility: CLINIC | Age: 41
End: 2021-06-25

## 2021-06-25 VITALS
WEIGHT: 159 LBS | SYSTOLIC BLOOD PRESSURE: 120 MMHG | OXYGEN SATURATION: 100 % | DIASTOLIC BLOOD PRESSURE: 80 MMHG | BODY MASS INDEX: 25.55 KG/M2 | HEART RATE: 69 BPM | HEIGHT: 66 IN | TEMPERATURE: 98.5 F

## 2021-06-25 DIAGNOSIS — R10.2 PELVIC PAIN: ICD-10-CM

## 2021-06-25 DIAGNOSIS — R94.31 ABNORMAL EKG: ICD-10-CM

## 2021-06-25 DIAGNOSIS — R07.2 PRECORDIAL PAIN: Primary | ICD-10-CM

## 2021-06-25 DIAGNOSIS — R79.89 ELEVATED TSH: ICD-10-CM

## 2021-06-25 PROCEDURE — 99214 OFFICE O/P EST MOD 30 MIN: CPT | Performed by: FAMILY MEDICINE

## 2021-06-25 PROCEDURE — 93000 ELECTROCARDIOGRAM COMPLETE: CPT | Performed by: FAMILY MEDICINE

## 2021-06-25 NOTE — PROGRESS NOTES
"Chief Complaint  Chest Pain    Subjective          Gio Deluca presents to [facility]  History of Present Illness    ECG 12 Lead    Date/Time: 6/25/2021 9:11 AM  Performed by: Dharmesh Mares MD  Authorized by: Dharmesh Mares MD   Comparison: compared with previous ECG from 6/4/2021  Rhythm: sinus rhythm  Rate: normal  Conduction: conduction normal  ST Segments: ST segments normal  T Waves: T waves normal  QRS axis: normal  Other findings: left atrial abnormality    Clinical impression: abnormal EKG            CP L side  X a year, on/off at rest, last all day, no GERD, non smoker, fxhx of MI., no meds, , no radiation, cough , no fever, phlegm no shortness of breath, pain rated 2/10, much better than before, also pelvic pain after sex a year ago saw Urologist and hot liquid sensation on R leg, pain  On pelvic floor floor, no prostate pain but  pelvic floor , no testicle pain, no hernia  Objective   Vital Signs:   /80 (BP Location: Right arm, Patient Position: Sitting)   Pulse 69   Temp 98.5 °F (36.9 °C)   Ht 167.6 cm (65.98\")   Wt 72.1 kg (159 lb)   SpO2 100%   BMI 25.68 kg/m²     Physical Exam  Vitals and nursing note reviewed.   Constitutional:       Appearance: He is well-developed.   HENT:      Head: Normocephalic and atraumatic.      Right Ear: External ear normal.      Left Ear: External ear normal.      Nose: Nose normal.   Eyes:      General: No scleral icterus.        Right eye: No discharge.         Left eye: No discharge.      Conjunctiva/sclera: Conjunctivae normal.      Pupils: Pupils are equal, round, and reactive to light.   Neck:      Thyroid: No thyromegaly.      Vascular: No JVD.      Trachea: No tracheal deviation.   Cardiovascular:      Rate and Rhythm: Normal rate and regular rhythm.      Heart sounds: Normal heart sounds. No murmur heard.   No friction rub. No gallop.    Pulmonary:      Effort: Pulmonary effort is normal. No " respiratory distress.      Breath sounds: Normal breath sounds. No wheezing or rales.   Chest:      Chest wall: No tenderness.   Abdominal:      General: Bowel sounds are normal. There is no distension.      Palpations: Abdomen is soft. There is no mass.      Tenderness: There is no abdominal tenderness. There is no guarding or rebound.      Hernia: No hernia is present.   Musculoskeletal:         General: No swelling, tenderness, deformity or signs of injury. Normal range of motion.      Cervical back: Normal range of motion and neck supple.      Comments: No chest pain with palpation   Lymphadenopathy:      Cervical: No cervical adenopathy.   Skin:     General: Skin is warm and dry.   Neurological:      General: No focal deficit present.      Mental Status: He is alert.      Cranial Nerves: No cranial nerve deficit.      Sensory: No sensory deficit.      Motor: No abnormal muscle tone.      Coordination: Coordination normal.      Deep Tendon Reflexes: Reflexes normal.   Psychiatric:         Mood and Affect: Mood normal.         Behavior: Behavior normal.         Thought Content: Thought content normal.         Judgment: Judgment normal.        Result Review :                 Assessment and Plan    Diagnoses and all orders for this visit:    1. Precordial pain (Primary)  -     ECG 12 Lead  -     Ambulatory Referral to Cardiology    2. Abnormal EKG  -     Ambulatory Referral to Cardiology    3. Elevated TSH  -     TSH  -     T3, Free  -     T4, Free    4. Pelvic pain  -     CT Pelvis Without Contrast; Future    Other orders  -     Cancel: ECG 12 Lead        Follow Up   Return if symptoms worsen or fail to improve.  Patient was given instructions and counseling regarding his condition or for health maintenance advice. Please see specific information pulled into the AVS if appropriate.   I offered patient to go to ER, patient declined, he understand risks include but not limited to death, patient preferred to wait for  the cardiologist, I will set appointment ASAP with a cardiologist, for pain for now only Tylenol, no anti-inflammatory medication,

## 2021-06-26 LAB
T3FREE SERPL-MCNC: 3.1 PG/ML (ref 2–4.4)
T4 FREE SERPL-MCNC: 1.34 NG/DL (ref 0.93–1.7)
TSH SERPL DL<=0.005 MIU/L-ACNC: 2.94 UIU/ML (ref 0.27–4.2)

## 2021-07-02 ENCOUNTER — HOSPITAL ENCOUNTER (EMERGENCY)
Facility: HOSPITAL | Age: 41
Discharge: HOME | End: 2021-07-02
Attending: EMERGENCY MEDICINE | Admitting: EMERGENCY MEDICINE

## 2021-07-02 ENCOUNTER — TELEPHONE (OUTPATIENT)
Dept: FAMILY MEDICINE CLINIC | Facility: CLINIC | Age: 41
End: 2021-07-02

## 2021-07-02 ENCOUNTER — APPOINTMENT (OUTPATIENT)
Dept: GENERAL RADIOLOGY | Facility: HOSPITAL | Age: 41
End: 2021-07-02

## 2021-07-02 VITALS
RESPIRATION RATE: 18 BRPM | HEIGHT: 66 IN | WEIGHT: 160 LBS | HEART RATE: 79 BPM | SYSTOLIC BLOOD PRESSURE: 122 MMHG | BODY MASS INDEX: 25.71 KG/M2 | TEMPERATURE: 98.1 F | OXYGEN SATURATION: 100 % | DIASTOLIC BLOOD PRESSURE: 99 MMHG

## 2021-07-02 DIAGNOSIS — R07.9 CHEST PAIN, UNSPECIFIED TYPE: ICD-10-CM

## 2021-07-02 LAB
ALBUMIN SERPL-MCNC: 4.3 G/DL (ref 3.5–5.2)
ALBUMIN/GLOB SERPL: 1.5 G/DL
ALP SERPL-CCNC: 56 U/L (ref 39–117)
ALT SERPL W P-5'-P-CCNC: 25 U/L (ref 1–41)
ANION GAP SERPL CALCULATED.3IONS-SCNC: 8.8 MMOL/L (ref 5–15)
AST SERPL-CCNC: 19 U/L (ref 1–40)
BASOPHILS # BLD AUTO: 0.02 10*3/MM3 (ref 0–0.2)
BASOPHILS NFR BLD AUTO: 0.4 % (ref 0–1.5)
BILIRUB SERPL-MCNC: 0.2 MG/DL (ref 0–1.2)
BUN SERPL-MCNC: 15 MG/DL (ref 6–20)
BUN/CREAT SERPL: 16.1 (ref 7–25)
CALCIUM SPEC-SCNC: 9 MG/DL (ref 8.6–10.5)
CHLORIDE SERPL-SCNC: 106 MMOL/L (ref 98–107)
CO2 SERPL-SCNC: 26.2 MMOL/L (ref 22–29)
CREAT SERPL-MCNC: 0.93 MG/DL (ref 0.76–1.27)
DEPRECATED RDW RBC AUTO: 42 FL (ref 37–54)
EOSINOPHIL # BLD AUTO: 0.17 10*3/MM3 (ref 0–0.4)
EOSINOPHIL NFR BLD AUTO: 3.2 % (ref 0.3–6.2)
ERYTHROCYTE [DISTWIDTH] IN BLOOD BY AUTOMATED COUNT: 12.7 % (ref 12.3–15.4)
GFR SERPL CREATININE-BSD FRML MDRD: 90 ML/MIN/1.73
GLOBULIN UR ELPH-MCNC: 2.9 GM/DL
GLUCOSE SERPL-MCNC: 88 MG/DL (ref 65–99)
HCT VFR BLD AUTO: 40.4 % (ref 37.5–51)
HGB BLD-MCNC: 13.6 G/DL (ref 13–17.7)
IMM GRANULOCYTES # BLD AUTO: 0.02 10*3/MM3 (ref 0–0.05)
IMM GRANULOCYTES NFR BLD AUTO: 0.4 % (ref 0–0.5)
LIPASE SERPL-CCNC: 41 U/L (ref 13–60)
LYMPHOCYTES # BLD AUTO: 1.43 10*3/MM3 (ref 0.7–3.1)
LYMPHOCYTES NFR BLD AUTO: 26.5 % (ref 19.6–45.3)
MCH RBC QN AUTO: 30.7 PG (ref 26.6–33)
MCHC RBC AUTO-ENTMCNC: 33.7 G/DL (ref 31.5–35.7)
MCV RBC AUTO: 91.2 FL (ref 79–97)
MONOCYTES # BLD AUTO: 0.51 10*3/MM3 (ref 0.1–0.9)
MONOCYTES NFR BLD AUTO: 9.5 % (ref 5–12)
NEUTROPHILS NFR BLD AUTO: 3.24 10*3/MM3 (ref 1.7–7)
NEUTROPHILS NFR BLD AUTO: 60 % (ref 42.7–76)
NRBC BLD AUTO-RTO: 0 /100 WBC (ref 0–0.2)
PLATELET # BLD AUTO: 178 10*3/MM3 (ref 140–450)
PMV BLD AUTO: 9.9 FL (ref 6–12)
POTASSIUM SERPL-SCNC: 4.1 MMOL/L (ref 3.5–5.2)
PROT SERPL-MCNC: 7.2 G/DL (ref 6–8.5)
QT INTERVAL: 387 MS
RBC # BLD AUTO: 4.43 10*6/MM3 (ref 4.14–5.8)
SODIUM SERPL-SCNC: 141 MMOL/L (ref 136–145)
TROPONIN T SERPL-MCNC: <0.01 NG/ML (ref 0–0.03)
WBC # BLD AUTO: 5.39 10*3/MM3 (ref 3.4–10.8)

## 2021-07-02 PROCEDURE — 93005 ELECTROCARDIOGRAM TRACING: CPT | Performed by: EMERGENCY MEDICINE

## 2021-07-02 PROCEDURE — 80053 COMPREHEN METABOLIC PANEL: CPT | Performed by: NURSE PRACTITIONER

## 2021-07-02 PROCEDURE — 93010 ELECTROCARDIOGRAM REPORT: CPT | Performed by: INTERNAL MEDICINE

## 2021-07-02 PROCEDURE — 71045 X-RAY EXAM CHEST 1 VIEW: CPT

## 2021-07-02 PROCEDURE — 85025 COMPLETE CBC W/AUTO DIFF WBC: CPT | Performed by: NURSE PRACTITIONER

## 2021-07-02 PROCEDURE — 93005 ELECTROCARDIOGRAM TRACING: CPT

## 2021-07-02 PROCEDURE — 99283 EMERGENCY DEPT VISIT LOW MDM: CPT

## 2021-07-02 PROCEDURE — 83690 ASSAY OF LIPASE: CPT | Performed by: NURSE PRACTITIONER

## 2021-07-02 PROCEDURE — 84484 ASSAY OF TROPONIN QUANT: CPT | Performed by: NURSE PRACTITIONER

## 2021-07-02 NOTE — ED PROVIDER NOTES
EMERGENCY DEPARTMENT ENCOUNTER    Room Number:  04/04  Date of encounter:  7/2/2021  PCP: Dharmesh Mares MD  Historian: patient   Full history not obtainable due to: none     HPI:  Chief Complaint: Chest pain     Context: Gio Deluca is a 41 y.o. male who presents to the ED c/o chest pain onset 1 month ago. Pain is left sided. Sharp and achy. Non radiating. Intermittent. States walking occasionally makes it worse. Associated soa. Endorses occasional nausea. No vomiting. no cough. No fever. States he came into the ER today because his SOA seemed worse.    No hx of chronic lung disease. Non smoker. No recent prolonged immobilization or hx of PE. No hx of HTN, DM, or HLD. No cardiac hx. No family dx of CAD.    PAST MEDICAL HISTORY    Active Ambulatory Problems     Diagnosis Date Noted   • Testicular pain, right 08/21/2020   • Pelvic pain 08/21/2020   • Wellness examination 06/04/2021   • Chest pain 06/04/2021   • Sore throat 06/04/2021   • Shortness of breath 06/04/2021   • Bronchitis 06/04/2021   • Cough 06/04/2021   • Abnormal EKG 06/25/2021   • Elevated TSH 06/25/2021     Resolved Ambulatory Problems     Diagnosis Date Noted   • No Resolved Ambulatory Problems     No Additional Past Medical History         PAST SURGICAL HISTORY  Past Surgical History:   Procedure Laterality Date   • KNEE ACL RECONSTRUCTION Left          FAMILY HISTORY  History reviewed. No pertinent family history.      SOCIAL HISTORY  Social History     Socioeconomic History   • Marital status: Single     Spouse name: Not on file   • Number of children: Not on file   • Years of education: Not on file   • Highest education level: Not on file   Tobacco Use   • Smoking status: Never Smoker   • Smokeless tobacco: Never Used   Substance and Sexual Activity   • Alcohol use: Yes   • Drug use: Never         ALLERGIES  Patient has no known allergies.        REVIEW OF SYSTEMS  Review of Systems   All systems reviewed and marked as negative except  as listed in HPI       PHYSICAL EXAM    I have reviewed the triage vital signs and nursing notes.    ED Triage Vitals   Temp Heart Rate Resp BP SpO2   07/02/21 1047 07/02/21 1047 07/02/21 1047 07/02/21 1107 07/02/21 1047   98.1 °F (36.7 °C) 69 16 128/99 100 %      Temp src Heart Rate Source Patient Position BP Location FiO2 (%)   07/02/21 1047 07/02/21 1047 07/02/21 1110 07/02/21 1110 --   Tympanic Monitor Lying Right arm        GENERAL: alert well developed, well nourished in no distress  HENT: NCAT, neck supple, trachea midline  EYES: no scleral icterus, PERRL, normal conjunctivae  CV: regular rhythm, regular rate, no murmur  RESPIRATORY: unlabored effort, CTAB. CW tender on palpation   ABDOMEN: soft, epigastric tenderness , nondistended, bowel sounds present  MUSCULOSKELETAL: no gross deformity  NEURO: alert,  sensory and motor function of extremities grossly intact, speech clear, mental status normal/baseline  SKIN: warm, dry, no rash  PSYCH:  Appropriate mood and affect    Vital signs and nursing notes reviewed.          LAB RESULTS  Recent Results (from the past 24 hour(s))   ECG 12 Lead    Collection Time: 07/02/21 10:55 AM   Result Value Ref Range    QT Interval 387 ms   Lipase    Collection Time: 07/02/21 11:26 AM    Specimen: Blood   Result Value Ref Range    Lipase 41 13 - 60 U/L   Comprehensive Metabolic Panel    Collection Time: 07/02/21 11:26 AM    Specimen: Blood   Result Value Ref Range    Glucose 88 65 - 99 mg/dL    BUN 15 6 - 20 mg/dL    Creatinine 0.93 0.76 - 1.27 mg/dL    Sodium 141 136 - 145 mmol/L    Potassium 4.1 3.5 - 5.2 mmol/L    Chloride 106 98 - 107 mmol/L    CO2 26.2 22.0 - 29.0 mmol/L    Calcium 9.0 8.6 - 10.5 mg/dL    Total Protein 7.2 6.0 - 8.5 g/dL    Albumin 4.30 3.50 - 5.20 g/dL    ALT (SGPT) 25 1 - 41 U/L    AST (SGOT) 19 1 - 40 U/L    Alkaline Phosphatase 56 39 - 117 U/L    Total Bilirubin 0.2 0.0 - 1.2 mg/dL    eGFR Non African Amer 90 >60 mL/min/1.73    Globulin 2.9 gm/dL     A/G Ratio 1.5 g/dL    BUN/Creatinine Ratio 16.1 7.0 - 25.0    Anion Gap 8.8 5.0 - 15.0 mmol/L   Troponin    Collection Time: 07/02/21 11:26 AM    Specimen: Blood   Result Value Ref Range    Troponin T <0.010 0.000 - 0.030 ng/mL   CBC Auto Differential    Collection Time: 07/02/21 11:26 AM    Specimen: Blood   Result Value Ref Range    WBC 5.39 3.40 - 10.80 10*3/mm3    RBC 4.43 4.14 - 5.80 10*6/mm3    Hemoglobin 13.6 13.0 - 17.7 g/dL    Hematocrit 40.4 37.5 - 51.0 %    MCV 91.2 79.0 - 97.0 fL    MCH 30.7 26.6 - 33.0 pg    MCHC 33.7 31.5 - 35.7 g/dL    RDW 12.7 12.3 - 15.4 %    RDW-SD 42.0 37.0 - 54.0 fl    MPV 9.9 6.0 - 12.0 fL    Platelets 178 140 - 450 10*3/mm3    Neutrophil % 60.0 42.7 - 76.0 %    Lymphocyte % 26.5 19.6 - 45.3 %    Monocyte % 9.5 5.0 - 12.0 %    Eosinophil % 3.2 0.3 - 6.2 %    Basophil % 0.4 0.0 - 1.5 %    Immature Grans % 0.4 0.0 - 0.5 %    Neutrophils, Absolute 3.24 1.70 - 7.00 10*3/mm3    Lymphocytes, Absolute 1.43 0.70 - 3.10 10*3/mm3    Monocytes, Absolute 0.51 0.10 - 0.90 10*3/mm3    Eosinophils, Absolute 0.17 0.00 - 0.40 10*3/mm3    Basophils, Absolute 0.02 0.00 - 0.20 10*3/mm3    Immature Grans, Absolute 0.02 0.00 - 0.05 10*3/mm3    nRBC 0.0 0.0 - 0.2 /100 WBC       Ordered the above labs and independently reviewed the results.        RADIOLOGY  XR Chest 1 View    Result Date: 7/2/2021  XR CHEST 1 VW-  07/02/2021  HISTORY: Chest pain.  Heart size is within normal limits. Lungs appear free of acute infiltrates. Bones and soft tissues are unremarkable.      No acute process.  This report was finalized on 7/2/2021 11:36 AM by Dr. Shaji Mejia M.D.        I ordered the above noted radiological studies. Independently reviewed by me and discussed with radiologist.  See dictation above for official radiology interpretation.      PROCEDURES    Procedures        MEDICATIONS GIVEN IN ER    Medications - No data to display      PROGRESS, DATA ANALYSIS, CONSULTS, AND MEDICAL DECISION  MAKING    All labs have been independently reviewed by me.  All radiology studies have been reviewed by me.   EKG's independently reviewed by me.  Discussion below represents my analysis of pertinent findings related to patient's condition, differential diagnosis, treatment plan and final disposition.    DIFFERENTIAL DIAGNOSIS INCLUDE BUT NOT LIMITED TO: [place], ACS, aortic dissection, costochondritis, pericarditis, endocarditis, pneumonia, acute bronchitis, pneumothorax, PE, viral syndrome, pancreatitis, biliary colic, cholecystitis, GERD, muscle spasm, anxiety       ED Course as of Jul 02 1249 Fri Jul 02, 2021   1209 Troponin T: <0.010 [JS]   1210 HEART SCORE:  History: (slight) 0 (moderate) 1 (high) 2::  1  ECG: (no abn) 0 (non spec) 1 (ST dev) 2:: 0  Age (<45) 0 (45-64) 1 (>65) 2:: 0  Risk factors: (0) 0 (1-2) 1 (>3) 2 ::0  Initial troponin: (<normal) 0 (1-3x high) 1 (>3x high) 2 0  HEART score: 1        [JS]   1249 Glucose: 88 [JS]   1249 BUN: 15 [JS]   1249 Creatinine: 0.93 [JS]   1249 Troponin T: <0.010 [JS]   1249 Lipase: 41 [JS]      ED Course User Index  [JS] Orquidea Alcantara APRN     MDM: Patient presents with chest pain intermittently x1 month.  Symptoms have been mostly constant and occurring daily.  He does report some exertional component to it however it is reproducible on palpation and he has epigastric tenderness.  Lipase is normal troponin is normal liver function enzymes are normal.  White blood cell count is normal.  Chest x-ray is negative.  His EKG is not suggestive of ischemic changes.  He has no family history of coronary disease or personal history.  My suspicion for underlying PE is low given that he has no risk factors, normal heart rate and oxygen saturation 100%.  Additionally he is PERC negative.  Given the reproducible nature of the pain he will be prescribed ibuprofen 800 mg every 8 hours for the next week and referred to cardiology for additional evaluation of chest  pain.        AS OF 12:49 EDT VITALS:        BP - 122/99  HR - 79  TEMP - 98.1 °F (36.7 °C) (Tympanic)  O2 SATS - 100%        DIAGNOSIS  Final diagnoses:   Chest pain, unspecified type         DISPOSITION  Discharge     Pt masked in first look. I wore appropriate PPE throughout my encounters with the pt. I performed hand hygiene on entry into the pt room and upon exit.     Dictated utilizing Dragon dictation:  Much of this encounter note is an electronic transcription/translation of spoken language to printed text. The electronic translation of spoken language may permit erroneous, or at times, nonsensical words or phrases to be inadvertently transcribed; Although I have reviewed the note for such errors, some may still exist.     Orquidea Alcantara, APRN  07/02/21 [id number]

## 2021-07-02 NOTE — ED PROVIDER NOTES
Pt presents to the ED complaining of intermittent chest pain over the past several months    On exam, pt is A&Ox3. NAD  PERRL, moist mucous membranes.  Normocephalic and atraumatic  Heart is RRR. Lungs are CTAB.  The patient has reproducible costochondral tenderness that exactly reproduces his symptoms  Abd is soft, nontender, nondistended, bowel sounds positive.   No pedal edema.  No calf tenderness.  Nonfocal neuro exam      I agree with midlevel plan to check labs, EKG and chest x-ray    PPE  Pt does not present with symptoms for COVID19; however, I was wearing a mask and goggles throughout all patient interaction.    Patient's troponin and EKG are normal.  His chest x-ray is unremarkable.  I feel the patient likely has musculoskeletal chest pain.  Due to his low heart score I feel he is stable for discharge home and outpatient follow-up with cardiology for his chest pain.  We will place the patient on a short course of NSAIDs and have advised him to return to the ER for worsening pain.        The ONELIA and I have discussed this patient's history, physical exam, and treatment plan.  I have reviewed the documentation and personally had a face to face interaction with the patient. I affirm the documentation and agree with the treatment and plan.  The attached note describes my personal findings.           Attila Penn MD  07/02/21 [id number]

## 2021-07-02 NOTE — TELEPHONE ENCOUNTER
Caller: Gio Deluca    Relationship to patient: Self    Best call back number: [phone]    Chief complaint: HAVING A HARD TIME BREATHING EVEN AT A REST     Patient directed to call 911 or go to their nearest emergency room.     Patient verbalized understanding: [x] Yes  [] No  If no, why?

## 2021-07-02 NOTE — ED TRIAGE NOTES
Chest pressure and soa x 6 months. He was on abx for bronchitis 2 weeks ago.      Patient was placed in face mask during first look triage.  Patient was wearing a face mask throughout encounter.  I wore personal protective equipment throughout the encounter.  Hand hygiene was performed before and after patient encounter.

## 2021-07-02 NOTE — TELEPHONE ENCOUNTER
I called the patient, he did go to there ER, he was just discharged when I called him. He stated they did an EKG and it was normal. But patient is requesting CT Scan and MRI for further elevation

## 2021-07-02 NOTE — DISCHARGE INSTRUCTIONS
Ibuprofen for pain 800mg every 8 hours for the next 7 days.   Follow-up with cardiology.  Follow-up with your family doctor.  Return for worsening symptoms or new concerns.  Continue care with your primary care physician and have your blood pressure regularly checked and managed. Normal blood pressure is 120/80.

## 2021-07-02 NOTE — ED NOTES
Pt c/o mid left chest pressure that started about a month ago. Pt states it is intermittent and gets nauseous at times. Denies the pain moving. No heart history. Pt a/ox4.     Rose Marie Infante, RN  Resident  07/02/21 1111

## 2021-07-14 ENCOUNTER — HOSPITAL ENCOUNTER (OUTPATIENT)
Dept: CT IMAGING | Facility: HOSPITAL | Age: 41
Discharge: HOME | End: 2021-07-14
Admitting: FAMILY MEDICINE

## 2021-07-14 DIAGNOSIS — R10.2 PELVIC PAIN: ICD-10-CM

## 2021-07-14 PROCEDURE — 72192 CT PELVIS W/O DYE: CPT
